# Patient Record
Sex: MALE | Race: WHITE | NOT HISPANIC OR LATINO | Employment: FULL TIME | URBAN - METROPOLITAN AREA
[De-identification: names, ages, dates, MRNs, and addresses within clinical notes are randomized per-mention and may not be internally consistent; named-entity substitution may affect disease eponyms.]

---

## 2019-10-22 ENCOUNTER — OFFICE VISIT (OUTPATIENT)
Dept: FAMILY MEDICINE CLINIC | Facility: CLINIC | Age: 59
End: 2019-10-22
Payer: COMMERCIAL

## 2019-10-22 VITALS
DIASTOLIC BLOOD PRESSURE: 80 MMHG | BODY MASS INDEX: 31.07 KG/M2 | SYSTOLIC BLOOD PRESSURE: 128 MMHG | HEIGHT: 70 IN | WEIGHT: 217 LBS | HEART RATE: 62 BPM | OXYGEN SATURATION: 97 % | TEMPERATURE: 96.5 F

## 2019-10-22 DIAGNOSIS — Z01.818 PREOPERATIVE EVALUATION OF A MEDICAL CONDITION TO RULE OUT SURGICAL CONTRAINDICATIONS (TAR REQUIRED): Primary | ICD-10-CM

## 2019-10-22 LAB
SL AMB  POCT GLUCOSE, UA: NEGATIVE
SL AMB LEUKOCYTE ESTERASE,UA: NEGATIVE
SL AMB POCT BILIRUBIN,UA: NEGATIVE
SL AMB POCT BLOOD,UA: NEGATIVE
SL AMB POCT CLARITY,UA: CLEAR
SL AMB POCT COLOR,UA: NORMAL
SL AMB POCT KETONES,UA: NEGATIVE
SL AMB POCT NITRITE,UA: NORMAL
SL AMB POCT PH,UA: 7
SL AMB POCT SPECIFIC GRAVITY,UA: 1
SL AMB POCT URINE PROTEIN: NEGATIVE
SL AMB POCT UROBILINOGEN: 0.2

## 2019-10-22 PROCEDURE — 99243 OFF/OP CNSLTJ NEW/EST LOW 30: CPT | Performed by: FAMILY MEDICINE

## 2019-10-22 PROCEDURE — 81003 URINALYSIS AUTO W/O SCOPE: CPT

## 2019-10-22 NOTE — ASSESSMENT & PLAN NOTE
Preoperative consultation  Patient appears to be in stable health    He is clear to have upcoming retinal procedure as described

## 2019-10-22 NOTE — PROGRESS NOTES
FAMILY PRACTICE OFFICE VISIT       NAME: Matilda Zavala  AGE: 63 y o  SEX: male       : 1960        MRN: [id number]    DATE: 10/22/2019  TIME: 11:34 AM    Assessment and Plan     Problem List Items Addressed This Visit        Other    Preoperative evaluation of a medical condition to rule out surgical contraindications (TAR required) - Primary     Preoperative consultation  Patient appears to be in stable health  He is clear to have upcoming retinal procedure as described                   Chief Complaint     Chief Complaint   Patient presents with    [facility]    Pre-op Exam       History of Present Illness     Patient has been diagnosed with a macular hole on his retina of his L eye  He denies any recent illness  He only takes over-the-counter Claritin for seasonal allergies  He is scheduled to have retinal procedure on 2019 by Fernando [address]      Review of Systems   Review of Systems   Constitutional: Negative  HENT: Negative  Eyes:        As per HPI   Respiratory: Negative  Cardiovascular: Negative  Gastrointestinal: Negative  Genitourinary: Negative  Musculoskeletal: Negative  Neurological: Negative  Psychiatric/Behavioral: Negative  Active Problem List     Patient Active Problem List   Diagnosis    Preoperative evaluation of a medical condition to rule out surgical contraindications (TAR required)       Past Medical History:  No past medical history on file  Past Surgical History:  No past surgical history on file  Family History:  No family history on file      Social History:  Social History     Socioeconomic History    Marital status: /Civil Union     Spouse name: Not on file    Number of children: Not on file    Years of education: Not on file    Highest education level: Not on file   Occupational History    Not on file   Social Needs    Financial resource strain: Not on file    Food insecurity: Worry: Not on file     Inability: Not on file    Transportation needs:     Medical: Not on file     Non-medical: Not on file   Tobacco Use    Smoking status: Not on file   Substance and Sexual Activity    Alcohol use: Not on file    Drug use: Not on file    Sexual activity: Not on file   Lifestyle    Physical activity:     Days per week: Not on file     Minutes per session: Not on file    Stress: Not on file   Relationships    Social connections:     Talks on phone: Not on file     Gets together: Not on file     Attends Worship service: Not on file     Active member of club or organization: Not on file     Attends meetings of clubs or organizations: Not on file     Relationship status: Not on file    Intimate partner violence:     Fear of current or ex partner: Not on file     Emotionally abused: Not on file     Physically abused: Not on file     Forced sexual activity: Not on file   Other Topics Concern    Not on file   Social History Narrative    Not on file       Objective     Vitals:    10/22/19 1103   BP: 128/80   Pulse: 62   Temp: (!) 96 5 °F (35 8 °C)   SpO2: 97%     Wt Readings from Last 3 Encounters:   10/22/19 98 4 kg (217 lb)       Physical Exam   Constitutional: He is oriented to person, place, and time  No distress  HENT:   Right Ear: External ear normal    Left Ear: External ear normal    Mouth/Throat: Oropharynx is clear and moist  No oropharyngeal exudate  Tympanic membranes within normal limits bilaterally   Neck:   No carotid bruits   Cardiovascular: Normal heart sounds  Regular rate and rhythm with no murmurs   Pulmonary/Chest: Breath sounds normal    Lungs are clear to auscultation without wheezes,rales, or rhonchi   Abdominal:   Abdomen is soft, nontender with positive bowel sounds  There is no rebound or guarding  No masses palpated   Musculoskeletal: He exhibits no edema  Lymphadenopathy:     He has no cervical adenopathy     Neurological: He is alert and oriented to person, place, and time  No cranial nerve deficit  Psychiatric: He has a normal mood and affect  His behavior is normal  Judgment and thought content normal        Pertinent Laboratory/Diagnostic Studies:  No results found for: GLUCOSE, BUN, CREATININE, CALCIUM, NA, K, CO2, CL  No results found for: ALT, AST, GGT, ALKPHOS, BILITOT    No results found for: WBC, HGB, HCT, MCV, PLT    No results found for: TSH    No results found for: CHOL  No results found for: TRIG  No results found for: HDL  No results found for: LDLCALC  No results found for: HGBA1C    No results found for this or any previous visit  No orders of the defined types were placed in this encounter  ALLERGIES:  Allergies   Allergen Reactions    Penicillins        Current Medications     No current outpatient medications on file  No current facility-administered medications for this visit  Health Maintenance     Health Maintenance   Topic Date Due    Hepatitis C Screening  1960    Depression Screening PHQ  1960    CRC Screening: Colonoscopy  1960    BMI: Followup Plan  06/12/1979    BMI: Adult  06/12/1979    DTaP,Tdap,and Td Vaccines (1 - Tdap) 06/12/1982    INFLUENZA VACCINE  07/01/2019    Pneumococcal Vaccine: 65+ Years (1 of 2 - PCV13) 06/12/2026    Pneumococcal Vaccine: Pediatrics (0 to 5 Years) and At-Risk Patients (6 to 59 Years)  Aged Out    HEPATITIS B VACCINES  Aged Out       There is no immunization history on file for this patient      Patrick Chavez MD

## 2021-04-08 DIAGNOSIS — Z23 ENCOUNTER FOR IMMUNIZATION: ICD-10-CM

## 2021-06-14 ENCOUNTER — VBI (OUTPATIENT)
Dept: ADMINISTRATIVE | Facility: OTHER | Age: 61
End: 2021-06-14

## 2022-08-18 ENCOUNTER — EVALUATION (OUTPATIENT)
Dept: PHYSICAL THERAPY | Facility: REHABILITATION | Age: 62
End: 2022-08-18
Payer: COMMERCIAL

## 2022-08-18 DIAGNOSIS — R42 VERTIGO: Primary | ICD-10-CM

## 2022-08-18 PROCEDURE — 97162 PT EVAL MOD COMPLEX 30 MIN: CPT

## 2022-08-18 PROCEDURE — 97110 THERAPEUTIC EXERCISES: CPT

## 2022-08-18 NOTE — PROGRESS NOTES
PT Evaluation     Today's date: 2022  Patient name: Elsa Eng  : 1960  MRN: [id number]  Referring provider: Luiz Knowles MD  Dx:   Encounter Diagnosis     ICD-10-CM    1  Vertigo  R42 PT plan of care cert/re-cert       Start Time: 1300  Stop Time: 1350  Total time in clinic (min): 50 minutes    PT PA supervised by PT LR with TERRENCE Ellington    Assessment  Assessment details: Elsa Eng is a pleasant 65 y o  male who presents with acute dizziness/vertigo  Prudencleann Ortizer presented with a right posterior canaliathiasis contributing to symptoms of dizzines and vertgio resulting in worry over not knowing what's wrong  No further referral appears necessary at this time based upon examination results  I expect he will improve in 4-6 weeks  Positive prognostic indicators include positive attitude toward recovery, good understanding of diagnosis and treatment plan options, acuity of symptoms, absence of peripheralization and absence of observed red flags  Negative prognostic indicators include none  Problem List:  1) Dizziness  2) Vertigo    Comparable signs:  1) Positive sung-hallpike on right side  2) Dizziness with bending over  Impairments: impaired balance and lacks appropriate home exercise program    Symptom irritability: moderateUnderstanding of Dx/Px/POC: good   Prognosis: good    Goals  Short Term Goals (by discharge):  1  Decreased intensity and frequency of symptoms  By 75%  2  Patient will exceed FOTO predicted outcome score  3  GROC >80%  4  Patient will be able to manage symptoms independently       Plan  Patient would benefit from: skilled physical therapy  Planned therapy interventions: canalith repositioning and home exercise program  Frequency: 1x week  Duration in visits: 6  Duration in weeks: 6  Treatment plan discussed with: patient        Subjective Evaluation    History of Present Illness  Mechanism of injury: Patient reports acute onset of vertigo/dizziness that started yesterday when he was bending over to  groceries  Patient reports room-spinning sensation and head fogginess that has persisted since yesterday  No history of vertigo dizziness but his mother and sister have had similar issues  Patient reports he feels off when he is up and moving but notices the room-spinning sensation only occurs with rolling in bed and laying  Patient feels mild nausea when symptoms occur but no vomiting  Patient denies any diplopia, dysarthria, dysphagia  Patient denies any extremity weakness or numbness/tingling  Denies any b/b dysfunction  Objective     Concurrent Complaints  Negative for headaches, nausea/motion sickness, tinnitus, visual change, hearing loss, memory loss, aural fullness, poor concentration and peripheral neuropathy  Neuro Exam:     Dizziness  Positive for disequilibrium and vertigo  Negative for oscillopsia, motion sickness, light-headedness, rocking or swaying, diplopia and floating or swimming  Exacerbating factors  Positive for bending over, rolling in bed, looking up, turning head and supine to/from sitting  Negative for walking, optokinetic movement and walking in busy environment  Symptoms   Duration: 1-3 minutes  Frequency: constant with the dysequilibirum, the vertigo occurs only with lying flat     Intensity at best: 3/10  Intensity at worst: 4/10    Headaches   Patient reports headaches: No      Oculomotor exam   Oculomotor ROM: WNL  Resting nystagmus: not present   Gaze holding nystagmus: not present left   Smooth pursuits: saccadic smooth pursuit  Vertical saccades: hypometric  Horizontal saccades: hypometric   Cover test: normal    Vitals  BP: 121/83  HR: 61bpm  SpO2: 96%    VOR Assessment  VOR: WNL  VORc: WNL  Head Thrust: WNL    Cervical AROM  Rotation: 25% limitation bilatera  Flexion: 25% limitation bilaterally  Extension: 25% limitation bilaterally  Sidebend: 50% limitation bilaterally    Positional Testing:  Landing-Hallpike: Positive for posterior canaliathiasis         Precautions: standard      Manuals 8/18            Epley Maneuver  x3 on R                                                    Neuro Re-Ed                                                                                                        Ther Ex                                                                                                                     Ther Activity                                       Gait Training                                       Modalities

## 2022-08-23 ENCOUNTER — APPOINTMENT (OUTPATIENT)
Dept: PHYSICAL THERAPY | Facility: REHABILITATION | Age: 62
End: 2022-08-23
Payer: COMMERCIAL

## 2022-09-19 NOTE — PROGRESS NOTES
PT Discharge Note  Patient has not returned or communicated intent to return to physical therapy for greater than 30 days  POC will be discharged at this time

## 2022-11-01 ENCOUNTER — NEW PATIENT COMPREHENSIVE (OUTPATIENT)
Dept: URBAN - METROPOLITAN AREA CLINIC 6 | Facility: CLINIC | Age: 62
End: 2022-11-01

## 2022-11-01 DIAGNOSIS — H25.812: ICD-10-CM

## 2022-11-01 DIAGNOSIS — H35.342: ICD-10-CM

## 2022-11-01 PROCEDURE — 92004 COMPRE OPH EXAM NEW PT 1/>: CPT

## 2022-11-01 ASSESSMENT — KERATOMETRY
OD_AXISANGLE2_DEGREES: 99
OD_AXISANGLE_DEGREES: 9
OS_K2POWER_DIOPTERS: 45.75
OS_K1POWER_DIOPTERS: 45.00
OD_K1POWER_DIOPTERS: 44.75
OS_AXISANGLE2_DEGREES: 88
OD_K2POWER_DIOPTERS: 45.75
OS_AXISANGLE_DEGREES: 178

## 2022-11-01 ASSESSMENT — TONOMETRY
OD_IOP_MMHG: 18
OS_IOP_MMHG: 16

## 2022-11-01 ASSESSMENT — VISUAL ACUITY
OS_PH: 20/50
OS_SC: 20/80
OD_SC: 20/25
OS_GLARE: 20/150+2

## 2022-12-01 ENCOUNTER — PRE-OP CATARACT MEASUREMENTS (OUTPATIENT)
Dept: URBAN - METROPOLITAN AREA CLINIC 6 | Facility: CLINIC | Age: 62
End: 2022-12-01

## 2022-12-01 DIAGNOSIS — H25.813: ICD-10-CM

## 2022-12-01 DIAGNOSIS — H35.412: ICD-10-CM

## 2022-12-01 DIAGNOSIS — H35.342: ICD-10-CM

## 2022-12-01 PROCEDURE — 92014 COMPRE OPH EXAM EST PT 1/>: CPT

## 2022-12-01 PROCEDURE — 92136 OPHTHALMIC BIOMETRY: CPT

## 2022-12-01 ASSESSMENT — VISUAL ACUITY
OD_GLARE: 20/70
OS_SC: 20/200
OD_PH: 20/25
OD_SC: 20/40+2
OU_SC: J1
OU_SC: 20/30-1
OS_PH: 20/50-1

## 2022-12-01 ASSESSMENT — KERATOMETRY
OD_K1POWER_DIOPTERS: 44.75
OD_K2POWER_DIOPTERS: 45.75
OS_AXISANGLE2_DEGREES: 88
OD_AXISANGLE_DEGREES: 9
OS_K1POWER_DIOPTERS: 45.00
OS_AXISANGLE_DEGREES: 178
OD_AXISANGLE2_DEGREES: 99
OS_K2POWER_DIOPTERS: 45.75

## 2022-12-01 ASSESSMENT — TONOMETRY
OD_IOP_MMHG: 13
OS_IOP_MMHG: 12

## 2022-12-15 ENCOUNTER — OFFICE VISIT (OUTPATIENT)
Dept: FAMILY MEDICINE CLINIC | Facility: CLINIC | Age: 62
End: 2022-12-15

## 2022-12-15 VITALS
BODY MASS INDEX: 30.88 KG/M2 | WEIGHT: 228 LBS | HEART RATE: 72 BPM | TEMPERATURE: 97.5 F | HEIGHT: 72 IN | OXYGEN SATURATION: 95 % | RESPIRATION RATE: 16 BRPM | DIASTOLIC BLOOD PRESSURE: 73 MMHG | SYSTOLIC BLOOD PRESSURE: 131 MMHG

## 2022-12-15 DIAGNOSIS — Z01.818 PREOPERATIVE EVALUATION OF A MEDICAL CONDITION TO RULE OUT SURGICAL CONTRAINDICATIONS (TAR REQUIRED): ICD-10-CM

## 2022-12-15 DIAGNOSIS — Z01.818 PREOP EXAMINATION: Primary | ICD-10-CM

## 2022-12-15 RX ORDER — CETIRIZINE HYDROCHLORIDE 10 MG/1
10 TABLET ORAL DAILY
COMMUNITY

## 2022-12-15 NOTE — PROGRESS NOTES
FAMILY PRACTICE OFFICE VISIT       NAME: Flash Joaquin  AGE: 65 y o  SEX: male       : 1960        MRN: [id number]    DATE: 12/15/2022  TIME: 10:06 AM    Assessment and Plan     Problem List Items Addressed This Visit        Other    Preoperative evaluation of a medical condition to rule out surgical contraindications (TAR required)     Preoperative consultation  Overall the patient appears to be in stable health and he is medically cleared for upcoming cataract surgery as described        Other Visit Diagnoses     Preop examination    -  Primary    Relevant Orders    POCT ECG (Completed)              Chief Complaint     Chief Complaint   Patient presents with   • Pre-op Exam       History of Present Illness     Patient in the office for preoperative consultation  He is scheduled to have cataract surgery of left eye on 2022  Procedure is to be performed by [facility] of [facility]  He denies any recent illness  Review of Systems   Review of Systems   Constitutional: Negative  HENT: Negative  Eyes: Positive for visual disturbance  Respiratory: Negative  Cardiovascular: Negative  Gastrointestinal: Negative  Genitourinary: Negative  Musculoskeletal: Negative  Skin: Negative  Neurological: Negative  Psychiatric/Behavioral: Negative  Active Problem List     Patient Active Problem List   Diagnosis   • Preoperative evaluation of a medical condition to rule out surgical contraindications (TAR required)       Past Medical History:  History reviewed  No pertinent past medical history  Past Surgical History:  Past Surgical History:   Procedure Laterality Date   • PARS PLANA VITRECTOMY W/ REPAIR OF MACULAR HOLE Left 2019       Family History:  History reviewed  No pertinent family history      Social History:  Social History     Socioeconomic History   • Marital status: /Civil Union     Spouse name: Not on file   • Number of children: Not on file   • Years of education: Not on file   • Highest education level: Not on file   Occupational History   • Not on file   Tobacco Use   • Smoking status: Not on file   • Smokeless tobacco: Not on file   Substance and Sexual Activity   • Alcohol use: Not on file   • Drug use: Not on file   • Sexual activity: Not on file   Other Topics Concern   • Not on file   Social History Narrative   • Not on file     Social Determinants of Health     Financial Resource Strain: Not on file   Food Insecurity: Not on file   Transportation Needs: Not on file   Physical Activity: Not on file   Stress: Not on file   Social Connections: Not on file   Intimate Partner Violence: Not on file   Housing Stability: Not on file       Objective     Vitals:    12/15/22 0846   BP: 131/73   Pulse: 72   Resp: 16   Temp: 97 5 °F (36 4 °C)   SpO2: 95%     Wt Readings from Last 3 Encounters:   12/15/22 103 kg (228 lb)   10/22/19 98 4 kg (217 lb)       Physical Exam  Constitutional:       General: He is not in acute distress  Appearance: Normal appearance  He is not ill-appearing  HENT:      Head: Normocephalic and atraumatic  Eyes:      General:         Right eye: No discharge  Left eye: No discharge  Extraocular Movements: Extraocular movements intact  Conjunctiva/sclera: Conjunctivae normal       Pupils: Pupils are equal, round, and reactive to light  Neck:      Vascular: No carotid bruit  Cardiovascular:      Rate and Rhythm: Normal rate and regular rhythm  Heart sounds: Normal heart sounds  No murmur heard  Pulmonary:      Effort: Pulmonary effort is normal       Breath sounds: Normal breath sounds  No wheezing, rhonchi or rales  Abdominal:      General: Abdomen is flat  Bowel sounds are normal  There is no distension  Palpations: Abdomen is soft  Tenderness: There is no abdominal tenderness  There is no guarding or rebound  Musculoskeletal:      Right lower leg: No edema        Left lower leg: No edema  Lymphadenopathy:      Cervical: No cervical adenopathy  Skin:     Findings: No rash  Neurological:      General: No focal deficit present  Mental Status: He is alert and oriented to person, place, and time  Cranial Nerves: No cranial nerve deficit  Psychiatric:         Mood and Affect: Mood normal          Behavior: Behavior normal          Thought Content: Thought content normal          Judgment: Judgment normal      EKG showed normal sinus rhythm at 63 bpm, normal axis, negative acute ischemic changes    Pertinent Laboratory/Diagnostic Studies:  No results found for: GLUCOSE, BUN, CREATININE, CALCIUM, NA, K, CO2, CL  No results found for: ALT, AST, GGT, ALKPHOS, BILITOT    No results found for: WBC, HGB, HCT, MCV, PLT    No results found for: TSH    No results found for: CHOL  No results found for: TRIG  No results found for: HDL  No results found for: LDLCALC  No results found for: HGBA1C    Results for orders placed or performed in visit on 10/22/19   POCT urine dip auto non-scope   Result Value Ref Range     COLOR,UA pale yellow     CLARITY,UA clear     SPECIFIC GRAVITY,UA 1 005      PH,UA 7 0     LEUKOCYTE ESTERASE,UA negative     NITRITE,UA negtive     GLUCOSE, UA negative     KETONES,UA negative     BILIRUBIN,UA negative     BLOOD,UA negative     POCT URINE PROTEIN negative     SL AMB POCT UROBILINOGEN 0 2        Orders Placed This Encounter   Procedures   • POCT ECG       ALLERGIES:  Allergies   Allergen Reactions   • Penicillins        Current Medications     Current Outpatient Medications   Medication Sig Dispense Refill   • cetirizine (ZyrTEC) 10 mg tablet Take 10 mg by mouth daily     • Loratadine-Pseudoephedrine (PX ALLERGY RELIEF D, LORATID, PO) Take by mouth     • Multiple Vitamins-Minerals (MULTIVITAMIN MEN PO) Take by mouth       No current facility-administered medications for this visit           Health Maintenance     Health Maintenance   Topic Date Due   • Hepatitis C Screening  Never done   • Hepatitis B Vaccine (1 of 3 - 3-dose series) Never done   • COVID-19 Vaccine (1) Never done   • HIV Screening  Never done   • BMI: Followup Plan  Never done   • Annual Physical  Never done   • DTaP,Tdap,and Td Vaccines (1 - Tdap) Never done   • Colorectal Cancer Screening  Never done   • Influenza Vaccine (1) 06/30/2023 (Originally 9/1/2022)   • Depression Screening  12/15/2023   • BMI: Adult  12/15/2023   • Pneumococcal Vaccine: Pediatrics (0 to 5 Years) and At-Risk Patients (6 to 59 Years)  Aged Out   • HIB Vaccine  Aged Out   • IPV Vaccine  Aged Out   • Hepatitis A Vaccine  Aged Out   • Meningococcal ACWY Vaccine  Aged Out   • HPV Vaccine  Aged Out       There is no immunization history on file for this patient      Collin Samaniego MD

## 2022-12-15 NOTE — ASSESSMENT & PLAN NOTE
Preoperative consultation    Overall the patient appears to be in stable health and he is medically cleared for upcoming cataract surgery as described

## 2022-12-28 ENCOUNTER — SURGERY/PROCEDURE (OUTPATIENT)
Dept: URBAN - METROPOLITAN AREA SURGICAL CENTER 6 | Facility: SURGICAL CENTER | Age: 62
End: 2022-12-28

## 2022-12-28 DIAGNOSIS — H21.81: ICD-10-CM

## 2022-12-28 DIAGNOSIS — H57.03: ICD-10-CM

## 2022-12-28 DIAGNOSIS — H25.812: ICD-10-CM

## 2022-12-28 PROCEDURE — 66982 XCAPSL CTRC RMVL CPLX WO ECP: CPT

## 2022-12-29 ENCOUNTER — 1 DAY POST-OP (OUTPATIENT)
Dept: URBAN - METROPOLITAN AREA CLINIC 6 | Facility: CLINIC | Age: 62
End: 2022-12-29

## 2022-12-29 DIAGNOSIS — Z96.1: ICD-10-CM

## 2022-12-29 PROCEDURE — 99024 POSTOP FOLLOW-UP VISIT: CPT

## 2022-12-29 ASSESSMENT — KERATOMETRY
OS_AXISANGLE2_DEGREES: 88
OD_AXISANGLE2_DEGREES: 99
OS_K2POWER_DIOPTERS: 45.75
OS_AXISANGLE2_DEGREES: 88
OD_K2POWER_DIOPTERS: 45.75
OS_K2POWER_DIOPTERS: 45.75
OS_AXISANGLE_DEGREES: 178
OD_AXISANGLE_DEGREES: 9
OD_AXISANGLE_DEGREES: 9
OS_AXISANGLE_DEGREES: 178
OD_K1POWER_DIOPTERS: 44.75
OS_K1POWER_DIOPTERS: 45.00
OS_K1POWER_DIOPTERS: 45.00
OD_AXISANGLE2_DEGREES: 99
OD_K2POWER_DIOPTERS: 45.75
OD_K1POWER_DIOPTERS: 44.75

## 2022-12-29 ASSESSMENT — VISUAL ACUITY: OS_SC: 20/25 +/-2

## 2022-12-29 ASSESSMENT — TONOMETRY: OS_IOP_MMHG: 15

## 2023-01-06 ENCOUNTER — 1 WEEK POST-OP (OUTPATIENT)
Dept: URBAN - METROPOLITAN AREA CLINIC 6 | Facility: CLINIC | Age: 63
End: 2023-01-06

## 2023-01-06 DIAGNOSIS — Z96.1: ICD-10-CM

## 2023-01-06 PROCEDURE — 99024 POSTOP FOLLOW-UP VISIT: CPT

## 2023-01-06 ASSESSMENT — KERATOMETRY
OS_K1POWER_DIOPTERS: 45.00
OD_AXISANGLE2_DEGREES: 99
OD_K1POWER_DIOPTERS: 44.75
OD_AXISANGLE_DEGREES: 9
OS_AXISANGLE_DEGREES: 178
OD_K2POWER_DIOPTERS: 45.75
OS_AXISANGLE2_DEGREES: 88
OS_K2POWER_DIOPTERS: 45.75

## 2023-01-06 ASSESSMENT — VISUAL ACUITY: OS_SC: 20/30

## 2023-01-06 ASSESSMENT — TONOMETRY
OD_IOP_MMHG: 11
OS_IOP_MMHG: 10

## 2023-02-03 ENCOUNTER — 1 MONTH POST-OP (OUTPATIENT)
Dept: URBAN - METROPOLITAN AREA CLINIC 6 | Facility: CLINIC | Age: 63
End: 2023-02-03

## 2023-02-03 DIAGNOSIS — Z96.1: ICD-10-CM

## 2023-02-03 PROCEDURE — 99024 POSTOP FOLLOW-UP VISIT: CPT

## 2023-02-03 ASSESSMENT — TONOMETRY
OD_IOP_MMHG: 12
OS_IOP_MMHG: 13

## 2023-02-03 ASSESSMENT — VISUAL ACUITY
OS_SC: 20/40+1
OD_SC: 20/40+1

## 2023-02-03 ASSESSMENT — KERATOMETRY
OD_K1POWER_DIOPTERS: 44.75
OD_AXISANGLE_DEGREES: 9
OS_AXISANGLE_DEGREES: 178
OD_AXISANGLE2_DEGREES: 99
OD_K2POWER_DIOPTERS: 45.75
OS_K1POWER_DIOPTERS: 45.00
OS_K2POWER_DIOPTERS: 45.75
OS_AXISANGLE2_DEGREES: 88

## 2023-05-19 ENCOUNTER — ESTABLISHED COMPREHENSIVE EXAM (OUTPATIENT)
Dept: URBAN - METROPOLITAN AREA CLINIC 6 | Facility: CLINIC | Age: 63
End: 2023-05-19

## 2023-05-19 DIAGNOSIS — H25.811: ICD-10-CM

## 2023-05-19 DIAGNOSIS — H35.412: ICD-10-CM

## 2023-05-19 DIAGNOSIS — Z96.1: ICD-10-CM

## 2023-05-19 DIAGNOSIS — H35.342: ICD-10-CM

## 2023-05-19 PROCEDURE — 92014 COMPRE OPH EXAM EST PT 1/>: CPT

## 2023-05-19 ASSESSMENT — TONOMETRY
OS_IOP_MMHG: 16
OD_IOP_MMHG: 13

## 2023-05-19 ASSESSMENT — KERATOMETRY
OS_K2POWER_DIOPTERS: 45.75
OD_AXISANGLE_DEGREES: 9
OD_AXISANGLE2_DEGREES: 99
OS_K1POWER_DIOPTERS: 45.00
OD_K1POWER_DIOPTERS: 44.75
OD_K2POWER_DIOPTERS: 45.75
OS_AXISANGLE2_DEGREES: 88
OS_AXISANGLE_DEGREES: 178

## 2023-05-19 ASSESSMENT — VISUAL ACUITY
OS_SC: 20/20-2
OD_SC: 20/25-2

## 2023-11-30 DIAGNOSIS — Z12.12 SCREENING FOR COLORECTAL CANCER: Primary | ICD-10-CM

## 2023-11-30 DIAGNOSIS — Z12.11 SCREENING FOR COLORECTAL CANCER: Primary | ICD-10-CM

## 2023-12-07 ENCOUNTER — OFFICE VISIT (OUTPATIENT)
Dept: FAMILY MEDICINE CLINIC | Facility: CLINIC | Age: 63
End: 2023-12-07
Payer: COMMERCIAL

## 2023-12-07 VITALS
SYSTOLIC BLOOD PRESSURE: 140 MMHG | DIASTOLIC BLOOD PRESSURE: 80 MMHG | BODY MASS INDEX: 31.15 KG/M2 | OXYGEN SATURATION: 94 % | RESPIRATION RATE: 16 BRPM | HEIGHT: 72 IN | TEMPERATURE: 97.6 F | WEIGHT: 230 LBS | HEART RATE: 81 BPM

## 2023-12-07 DIAGNOSIS — M79.601 PAIN OF RIGHT UPPER EXTREMITY: Primary | ICD-10-CM

## 2023-12-07 PROCEDURE — 99213 OFFICE O/P EST LOW 20 MIN: CPT | Performed by: FAMILY MEDICINE

## 2023-12-07 RX ORDER — MELOXICAM 15 MG/1
15 TABLET ORAL DAILY
Qty: 30 TABLET | Refills: 0 | Status: SHIPPED | OUTPATIENT
Start: 2023-12-07

## 2023-12-07 RX ORDER — METHOCARBAMOL 500 MG/1
500 TABLET, FILM COATED ORAL
Qty: 15 TABLET | Refills: 0 | Status: SHIPPED | OUTPATIENT
Start: 2023-12-07

## 2023-12-07 NOTE — PROGRESS NOTES
FAMILY PRACTICE OFFICE VISIT       NAME: Nasra Gupta  AGE: 59 y.o. SEX: male       : 1960        MRN: [id number]    DATE: 2023  TIME: 8:47 AM    Assessment and Plan     Problem List Items Addressed This Visit       Pain of right upper extremity - Primary     Arm pain. Patient suspected to have rotator cuff tendinopathy. No evidence of any tears. Patient given prescription to initiate meloxicam 15 mg once daily. He will also use methocarbamol 500 mg nightly. Patient will call if symptoms persist without improvement after 2 weeks whereby we would consider physical therapy consultation         Relevant Medications    meloxicam (MOBIC) 15 mg tablet    methocarbamol (ROBAXIN) 500 mg tablet           Chief Complaint     Chief Complaint   Patient presents with    Shoulder Pain     Patient is here for rt shoulder pain 6 + mos       History of Present Illness     Patient in the office with 6-month history of right upper arm discomfort. Patient describes muscular type of ache fairly regularly but denies any acute injuries. Symptoms are felt more with lifting or raking leaves. He denies any radicular type of pain from his neck. He has taken over-the-counter ibuprofen on a rare occasion. He has no prior history of injuries to his right arm        Review of Systems   Review of Systems   Constitutional: Negative. Musculoskeletal:  Positive for arthralgias. Neurological: Negative. Active Problem List     Patient Active Problem List   Diagnosis    Preoperative evaluation of a medical condition to rule out surgical contraindications (TAR required)    Pain of right upper extremity       Past Medical History:  No past medical history on file. Past Surgical History:  Past Surgical History:   Procedure Laterality Date    PARS PLANA VITRECTOMY W/ REPAIR OF MACULAR HOLE Left 2019       Family History:  No family history on file.     Social History:  Social History     Socioeconomic History Marital status: /Civil Union     Spouse name: Not on file    Number of children: Not on file    Years of education: Not on file    Highest education level: Not on file   Occupational History    Not on file   Tobacco Use    Smoking status: Never    Smokeless tobacco: Never   Substance and Sexual Activity    Alcohol use: Yes     Comment: rare    Drug use: Never    Sexual activity: Yes   Other Topics Concern    Not on file   Social History Narrative    Not on file     Social Determinants of Health     Financial Resource Strain: Not on file   Food Insecurity: Not on file   Transportation Needs: Not on file   Physical Activity: Not on file   Stress: Not on file   Social Connections: Not on file   Intimate Partner Violence: Not on file   Housing Stability: Not on file       Objective     Vitals:    12/07/23 0818   BP: 140/80   Pulse: 81   Resp: 16   Temp: 97.6 °F (36.4 °C)   SpO2: 94%     Wt Readings from Last 3 Encounters:   12/07/23 104 kg (230 lb)   12/15/22 103 kg (228 lb)   10/22/19 98.4 kg (217 lb)       Physical Exam  Constitutional:       General: He is not in acute distress. Appearance: Normal appearance. He is not ill-appearing. Musculoskeletal:         General: No tenderness, deformity or signs of injury. Comments: Patient with normal range of motion of right arm. Increased discomfort along right triceps area with internal and external rotation. Muscle strength +5/5. Deep tendon reflexes +2. Normal range of motion of cervical spine in all directions   Neurological:      Mental Status: He is alert.          Pertinent Laboratory/Diagnostic Studies:  No results found for: "GLUCOSE", "BUN", "CREATININE", "CALCIUM", "NA", "K", "CO2", "CL"  No results found for: "ALT", "AST", "GGT", "ALKPHOS", "BILITOT"    No results found for: "WBC", "HGB", "HCT", "MCV", "PLT"    No results found for: "TSH"    No results found for: "CHOL"  No results found for: "TRIG"  No results found for: "HDL"  No results found for: "[facility]"  No results found for: "HGBA1C"    Results for orders placed or performed in visit on 03/31/21   Human Immunodeficiency Virus 1/2 Antigen / Antibody ( Fourth Generation) with Reflex Testing   Result Value Ref Range    HIV Screen Non-Reactive        No orders of the defined types were placed in this encounter. ALLERGIES:  Allergies   Allergen Reactions    Penicillins        Current Medications     Current Outpatient Medications   Medication Sig Dispense Refill    cetirizine (ZyrTEC) 10 mg tablet Take 10 mg by mouth daily      Loratadine-Pseudoephedrine (PX ALLERGY RELIEF D, LORATID, PO) Take by mouth      meloxicam (MOBIC) 15 mg tablet Take 1 tablet (15 mg total) by mouth daily 30 tablet 0    methocarbamol (ROBAXIN) 500 mg tablet Take 1 tablet (500 mg total) by mouth daily at bedtime 15 tablet 0    Multiple Vitamins-Minerals (MULTIVITAMIN MEN PO) Take by mouth       No current facility-administered medications for this visit. Health Maintenance     Health Maintenance   Topic Date Due    Hepatitis C Screening  Never done    COVID-19 Vaccine (1) Never done    BMI: Followup Plan  Never done    Annual Physical  Never done    DTaP,Tdap,and Td Vaccines (1 - Tdap) Never done    Colorectal Cancer Screening  Never done    BMI: Adult  12/15/2023    Influenza Vaccine (1) 06/30/2024 (Originally 9/1/2023)    Depression Screening  12/07/2024    HIV Screening  Completed    Pneumococcal Vaccine: Pediatrics (0 to 5 Years) and At-Risk Patients (6 to 59 Years)  Aged Out    HIB Vaccine  Aged Out    IPV Vaccine  Aged Out    Hepatitis A Vaccine  Aged Out    Meningococcal ACWY Vaccine  Aged Out    HPV Vaccine  Aged Out       There is no immunization history on file for this patient.     Jaycee Lal MD

## 2023-12-07 NOTE — ASSESSMENT & PLAN NOTE
Arm pain. Patient suspected to have rotator cuff tendinopathy. No evidence of any tears. Patient given prescription to initiate meloxicam 15 mg once daily. He will also use methocarbamol 500 mg nightly.   Patient will call if symptoms persist without improvement after 2 weeks whereby we would consider physical therapy consultation

## 2024-01-02 ENCOUNTER — TELEPHONE (OUTPATIENT)
Dept: FAMILY MEDICINE CLINIC | Facility: CLINIC | Age: 64
End: 2024-01-02

## 2024-01-02 DIAGNOSIS — M79.601 PAIN OF RIGHT UPPER EXTREMITY: ICD-10-CM

## 2024-01-02 RX ORDER — MELOXICAM 15 MG/1
15 TABLET ORAL DAILY
Qty: 30 TABLET | Refills: 2 | Status: SHIPPED | OUTPATIENT
Start: 2024-01-02

## 2024-01-02 NOTE — TELEPHONE ENCOUNTER
Refill for meloxicam sent to pharmacy.  Referral for physical therapy placed in [facility].  Patient may call for appointment.  Please provide phone number

## 2024-01-02 NOTE — TELEPHONE ENCOUNTER
"Received VM from patient:     \"Hi, this is Jered Caraballo calling. I had an appointment last week with a couple of weeks ago with Doctor Arlene. For issue I'm having with my right arm, he gave me some medication. I'm almost out of that medication. There's no refills on it and he was saying the next step may possibly be some sessions at the physical therapy session So I don't know if he needs me to have make another appointment to stop him before I do that or how he wants to handle it. But I'm at the end of the medication so I probably need a refill on that even though there's says 0 refills on the bottle and then possibly taking a look at doing physical therapy. So whatever he wants to do, please let me know and I'll either set up an appointment to come back in there or head to physical therapy if he has that set up. Thank you. Bye, bye.\"    Can meloxicam and robaxin be refilled?  "

## 2024-01-04 ENCOUNTER — EVALUATION (OUTPATIENT)
Dept: PHYSICAL THERAPY | Facility: REHABILITATION | Age: 64
End: 2024-01-04
Payer: COMMERCIAL

## 2024-01-04 DIAGNOSIS — M79.601 PAIN OF RIGHT UPPER EXTREMITY: ICD-10-CM

## 2024-01-04 PROCEDURE — 97110 THERAPEUTIC EXERCISES: CPT | Performed by: PHYSICAL THERAPIST

## 2024-01-04 PROCEDURE — 97162 PT EVAL MOD COMPLEX 30 MIN: CPT | Performed by: PHYSICAL THERAPIST

## 2024-01-04 NOTE — PROGRESS NOTES
PT Evaluation     Today's date: 2024  Patient name: Jered Caraballo  : 1960  MRN: [id number]  Referring provider: Naveed Rao MD  Dx:   Encounter Diagnosis     ICD-10-CM    1. Pain of right upper extremity  M79.601 Ambulatory Referral to Physical Therapy          Start Time: 945  Stop Time: 1025  Total time in clinic (min): 40 minutes    Assessment  Assessment details: Problem List:  1) Glenohumeral hypomobility  2) Pain with everyday use and movements    Jered Caraballo is a pleasant 63 y.o. male who presents with R shoulder/arm pain that has been bothering him for at least 6 months. Feels it is worsening.  He has severe shoulder hypomobility especially into passive/active ER and active IR, weakness, and pain with everyday tasks resulting in the pain he is experiencing and fear of not being able to keep active.  Patient would benefit from further consultation from orthopedics. I expect he will improve in 12-16 weeks. Jered would benefit from skilled physical therapy to address his limitations and allow him to return to his PLOF.    At this time, PT recommends consultation from orthopedics to further evaluate his shoulder pain and discomfort. Following consultation, pt will reach out to office for further treatment if necessary.   Impairments: abnormal or restricted ROM, activity intolerance, impaired physical strength, lacks appropriate home exercise program, pain with function, weight-bearing intolerance and poor posture     Symptom irritability: lowUnderstanding of Dx/Px/POC: good   Prognosis: good    Goals  ST-4 weeks  Patient will be independent with home exercise program.   Patient will be able to manage symptoms independently.  Patient will decrease pain by 25-50%    LTG: by discharge  Patient will improve FOTO to goal  Patient will report minimal (1-2/10) pain with aggravating activities to display improvements in overall functional status        Plan  Patient would benefit from: skilled  physical therapy  Planned modality interventions: cryotherapy, thermotherapy: hydrocollator packs and unattended electrical stimulation  Planned therapy interventions: IADL retraining, joint mobilization, manual therapy, massage, ADL training, activity modification, abdominal trunk stabilization, ADL retraining, balance, balance/weight bearing training, neuromuscular re-education, body mechanics training, behavior modification, strengthening, stretching, therapeutic activities, therapeutic exercise, therapeutic training, transfer training, graded exercise, graded motor, home exercise program, graded activity, gait training, functional ROM exercises, patient education, postural training, IASTM, kinesiology taping and flexibility  Frequency: 2x week  Duration in visits: 16  Duration in weeks: 8  Treatment plan discussed with: patient        Subjective Evaluation    History of Present Illness  Mechanism of injury: Jered is a 63 y.o. male presenting to physical therapy on 24 with referral from MD for R shoulder/arm pain that has been bothering him for about 6-7 months. Denies numbness/tingling. Difficulty with lifting, reaching arm behind back and neck and moving arm into certain positions. Has issues sleeping due to pain.           Quality of life: good    Patient Goals  Patient goals for therapy: increased strength, independence with ADLs/IADLs, return to sport/leisure activities, decreased pain and increased motion    Pain  Current pain ratin  At best pain ratin  At worst pain ratin  Location: whole shoulder  Quality: dull ache  Relieving factors: relaxation, rest, medications and change in position  Aggravating factors: lifting and overhead activity (putting on and off coat, reaching behind back, reaching behind head)  Progression: worsening    Treatments  Current treatment: medication        Objective    Myotomes all intact b/l   Dermatome: all intact b/l           Reflexes:  C5-6: 2+ b/l   C5-6:   2+ b/l    C7:  2+ b/l                        MMT         AROM          PROM    Shoulder       L       R        L           R      L     R   Flex.    105 deg PAIN  deg   Abd.    95 deg PAIN WFL 75 deg   IR.    Sacrum PAIN WFL 30 deg   ER.    C6 PAIN WFL 0 deg at side            Rhomboid         Mid Trap         Low Trap         Serratus         Infraspnatus         Teres Major         Sub Scap             Rotator Cuff Testing:  ER Lag: negative   Drop Arm: negative   Painful Arc Sign: positive    Belly Press: negative     Impingement Testing:   Kaitlin: positive  Infraspinatus MMT: positive  Painful Arc Sign: positive    Thoracic Spine:          Segmental mobility: GHJ: hypomobile into posterior and inferior glides          Precautions: standard    Manuals 1/4                                                                Neuro Re-Ed 1/4                                                                                                       Ther Ex 1/4                                                                                                       Education 10'            Ther Activity                                       Gait Training                                       Modalities

## 2024-01-29 ENCOUNTER — APPOINTMENT (OUTPATIENT)
Dept: RADIOLOGY | Facility: OTHER | Age: 64
End: 2024-01-29
Payer: COMMERCIAL

## 2024-01-29 ENCOUNTER — OFFICE VISIT (OUTPATIENT)
Dept: OBGYN CLINIC | Facility: OTHER | Age: 64
End: 2024-01-29
Payer: COMMERCIAL

## 2024-01-29 VITALS
SYSTOLIC BLOOD PRESSURE: 133 MMHG | DIASTOLIC BLOOD PRESSURE: 86 MMHG | BODY MASS INDEX: 30.61 KG/M2 | WEIGHT: 226 LBS | HEART RATE: 86 BPM | HEIGHT: 72 IN

## 2024-01-29 DIAGNOSIS — M25.511 ACUTE PAIN OF RIGHT SHOULDER: ICD-10-CM

## 2024-01-29 DIAGNOSIS — M75.01 ADHESIVE CAPSULITIS OF RIGHT SHOULDER: Primary | ICD-10-CM

## 2024-01-29 PROCEDURE — 73030 X-RAY EXAM OF SHOULDER: CPT

## 2024-01-29 PROCEDURE — 20610 DRAIN/INJ JOINT/BURSA W/O US: CPT | Performed by: ORTHOPAEDIC SURGERY

## 2024-01-29 PROCEDURE — 99204 OFFICE O/P NEW MOD 45 MIN: CPT | Performed by: ORTHOPAEDIC SURGERY

## 2024-01-29 RX ORDER — BUPIVACAINE HYDROCHLORIDE 2.5 MG/ML
1 INJECTION, SOLUTION INFILTRATION; PERINEURAL
Status: COMPLETED | OUTPATIENT
Start: 2024-01-29 | End: 2024-01-29

## 2024-01-29 RX ORDER — BETAMETHASONE SODIUM PHOSPHATE AND BETAMETHASONE ACETATE 3; 3 MG/ML; MG/ML
6 INJECTION, SUSPENSION INTRA-ARTICULAR; INTRALESIONAL; INTRAMUSCULAR; SOFT TISSUE
Status: COMPLETED | OUTPATIENT
Start: 2024-01-29 | End: 2024-01-29

## 2024-01-29 RX ADMIN — BETAMETHASONE SODIUM PHOSPHATE AND BETAMETHASONE ACETATE 6 MG: 3; 3 INJECTION, SUSPENSION INTRA-ARTICULAR; INTRALESIONAL; INTRAMUSCULAR; SOFT TISSUE at 08:30

## 2024-01-29 RX ADMIN — BUPIVACAINE HYDROCHLORIDE 1 ML: 2.5 INJECTION, SOLUTION INFILTRATION; PERINEURAL at 08:30

## 2024-01-29 NOTE — PROGRESS NOTES
I personally examined the patient and reviewed the history provided.  I agree with the note and the assessment and plan by Dr. Sheela Okeefe MD.     Assessment:    Right shoulder adhesive capsulitis    Plan:    Did review the natural history and treatment options for adhesive capsulitis of the right shoulder with the patient clearly has.  We did discuss the association of diabetes with adhesive capsulitis and the patient does not have diabetes at this time and does have good clinical follow-up with his primary care doctor.  He was provided with an intra-articular humeral injection under my direct supervision as detailed below as well as referral to physical therapy for stretching only.  Reevaluation in 3 months or sooner if necessary, avoidance of any resistance exercises is essential in improving his symptoms.  Imaging the form of an MRI is not required at this time but could always be considered in the future especially if his range of motion improves but he maintains his symptoms        Assessment  Diagnoses and all orders for this visit:    Adhesive capsulitis of right shoulder  -     Large joint arthrocentesis: R glenohumeral    Acute pain of right shoulder  -     XR shoulder 2+ vw right; Future      Discussion and Plan:  Mr Caraballo presentation is consistent with adhesive capsulitis of the right shoulder. He has tried activity modification and NSAIDs without relief. His initial trial of PT was ineffective 2/2 pain. He would benefit from trial of a corticosteroid injection today with continued PT (stretching only, no strengthening). Post injection instructions provided. Discussed repeat injection can be undertaken at earliest in 3 months. Return to care in 3 months for re-evaluation.       Subjective:   Patient ID: Jered Caraballo is a 63 y.o. male      HPI  The patient presents with a chief complaint of right shoulder pain.   The pain began 6 month(s) ago and is not associated with an acute injury. The patient  describes the pain as aching in intensity,  intermittent, occurring with increasing frequency in timing, and localizes the pain to the  right glenohumeral joint.  The pain is worse with overhead work, overuse, and raising arm over head and relieved by avoiding the painful activities.  The pain is not associated with numbness and tingling.  The pain is not associated with constitutional symptoms. The patient is not awoken at night by the pain.  He had initially seen his PCP, who provided meloxicam/robaxin and a referral to PT. He saw PT x1, but was limited 2/2 pain so he was referred to ortho for potential CSI to allow more therapy to be undertaken.          The following portions of the patient's history were reviewed and updated as appropriate: allergies, current medications, past family history, past medical history, past social history, past surgical history and problem list.    Review of Systems  Negative unless otherwise stated above.    Objective:  /86 (BP Location: Right arm, Patient Position: Sitting, Cuff Size: Large)   Pulse 86   Ht 6' (1.829 m) Comment: verbal  Wt 103 kg (226 lb)   BMI 30.65 kg/m²       Right Shoulder Exam     Range of Motion   Active abduction:  100   External rotation:  20   Forward flexion:  160   Internal rotation 0 degrees:  Sacrum     Muscle Strength   Abduction: 5/5   Internal rotation: 4/5   External rotation: 5/5     Tests   Hallman test: negative  Impingement: negative  Drop arm: negative    Other   Erythema: absent  Sensation: normal  Pulse: present            Physical Exam  Vitals reviewed.   Constitutional:       General: He is not in acute distress.  HENT:      Head: Normocephalic and atraumatic.   Eyes:      Extraocular Movements: Extraocular movements intact.      Conjunctiva/sclera: Conjunctivae normal.      Pupils: Pupils are equal, round, and reactive to light.   Cardiovascular:      Rate and Rhythm: Normal rate.      Pulses: Normal pulses.   Pulmonary:       "Effort: Pulmonary effort is normal.   Abdominal:      General: Abdomen is flat.   Musculoskeletal:      Cervical back: Normal range of motion.   Skin:     General: Skin is warm and dry.      Capillary Refill: Capillary refill takes less than 2 seconds.   Neurological:      Mental Status: He is alert and oriented to person, place, and time.   Psychiatric:         Mood and Affect: Mood normal.           I have personally reviewed pertinent films in PACS and my interpretation is as follows.  XR R shoulder demonstrates no acute fracture or dislocation. Preserved joint space.    Large joint arthrocentesis: R glenohumeral  [place] Protocol:  Consent: Verbal consent obtained.  Risks and benefits: risks, benefits and alternatives were discussed  Consent given by: patient  Time out: Immediately prior to procedure a \"time out\" was called to verify the correct patient, procedure, equipment, support staff and site/side marked as required.  Patient understanding: patient states understanding of the procedure being performed  Site marked: the operative site was marked  Patient identity confirmed: verbally with patient  Supporting Documentation  Indications: pain   Procedure Details  Location: shoulder - R glenohumeral  Needle size: 22 G  Ultrasound guidance: no  Medications administered: 1 mL bupivacaine 0.25 %; 6 mg betamethasone acetate-betamethasone sodium phosphate 6 (3-3) mg/mL    Patient tolerance: patient tolerated the procedure well with no immediate complications  Dressing:  Sterile dressing applied        I did personally review the notes in the electronic medical record system from his visits with Dr. Chase of primary care as well as his visit with Hernando Sawyer of physical therapy with resultant referral to see me for further evaluation    "

## 2024-01-29 NOTE — PATIENT INSTRUCTIONS
You have a frozen shoulder, also known as adhesive capsulitis. It will take 1-2 weeks to feel relief from the cortisone injection provided today. This will hopefully provide sufficient relief to work on stretching with PT. Do not work on strengthening. Return to care in 3 months for re-eval.

## 2024-02-01 ENCOUNTER — TELEPHONE (OUTPATIENT)
Dept: OBGYN CLINIC | Facility: HOSPITAL | Age: 64
End: 2024-02-01

## 2024-02-01 NOTE — TELEPHONE ENCOUNTER
Referral was placed at his appointment this week.  When he starts therapy is per their availability - we would hope by sometime next week.

## 2024-02-01 NOTE — TELEPHONE ENCOUNTER
Caller: Patient     Doctor: Kristina    Reason for call: Patient would like to know when he can resume Physical Therapy again.     Call back#: [phone]

## 2024-02-07 ENCOUNTER — APPOINTMENT (OUTPATIENT)
Dept: PHYSICAL THERAPY | Facility: REHABILITATION | Age: 64
End: 2024-02-07
Payer: COMMERCIAL

## 2024-02-12 ENCOUNTER — EVALUATION (OUTPATIENT)
Dept: PHYSICAL THERAPY | Facility: REHABILITATION | Age: 64
End: 2024-02-12
Payer: COMMERCIAL

## 2024-02-12 DIAGNOSIS — M75.01 ADHESIVE CAPSULITIS OF RIGHT SHOULDER: Primary | ICD-10-CM

## 2024-02-12 PROCEDURE — 97110 THERAPEUTIC EXERCISES: CPT | Performed by: PHYSICAL THERAPIST

## 2024-02-12 PROCEDURE — 97140 MANUAL THERAPY 1/> REGIONS: CPT | Performed by: PHYSICAL THERAPIST

## 2024-02-12 NOTE — PROGRESS NOTES
Daily Note     Today's date: 2024  Patient name: Jered Caraballo  : 1960  MRN: [id number]  Referring provider: Naveed Rao MD  Dx:   Encounter Diagnosis     ICD-10-CM    1. Adhesive capsulitis of right shoulder  M75.01           Start Time: 0815  Stop Time: 0900  Total time in clinic (min): 45 minutes    Subjective: Reports his shoulder is doing better since his injection. States his pain is decreased and he can move his arm better.     Objective: See treatment diary below    R shoulder AROM  Flexion: 142 deg  Abduction: 129 deg  ER: T1  IR: PSIS  R shoulder PROM:   Flexion: 150 deg   Abduction: 112 deg   ER: 35 deg   IR: 20 deg    Assessment: Tolerated treatment well. Significant improvements in range of motion and pain noted since last treatment session. Went over comprehensive HEP for pt to perform everyday to maintain and improve mobility. Patient exhibited good technique with therapeutic exercises and would benefit from continued PT    Plan: Continue per plan of care.      Precautions: standard    Manuals            GHJ mobs  Post glide GrIII 3x30 QD           ACJ mobs  PA glide 3x30 GrIII           SCJ mobs  GrIII inf glide 3x30 QD                                                  Assessment  QD 12'           Neuro Re-Ed                                                                                                       Ther Ex            Supine shld flexion AAROM  HEP 3x10           IR stretch with strap  10x HEP           ER stretch on wall  10x HEP                                                               Education 10' 15' HEP           Ther Activity                                       Gait Training                                       Modalities

## 2024-02-26 ENCOUNTER — OFFICE VISIT (OUTPATIENT)
Dept: PHYSICAL THERAPY | Facility: REHABILITATION | Age: 64
End: 2024-02-26
Payer: COMMERCIAL

## 2024-02-26 DIAGNOSIS — M75.01 ADHESIVE CAPSULITIS OF RIGHT SHOULDER: Primary | ICD-10-CM

## 2024-02-26 PROCEDURE — 97110 THERAPEUTIC EXERCISES: CPT | Performed by: PHYSICAL THERAPIST

## 2024-02-26 PROCEDURE — 97140 MANUAL THERAPY 1/> REGIONS: CPT | Performed by: PHYSICAL THERAPIST

## 2024-02-26 NOTE — PROGRESS NOTES
Daily Note     Today's date: 2024  Patient name: Jered Caraballo  : 1960  MRN: [id number]  Referring provider: Naveed Rao MD  Dx:   Encounter Diagnosis     ICD-10-CM    1. Adhesive capsulitis of right shoulder  M75.01           Start Time: 815  Stop Time: 0858  Total time in clinic (min): 43 minutes    Subjective: Reports the exercises have been helping, but still has difficulty with reaching behind his back.       Objective: See treatment diary below    Flexion: 141 deg  Abduction: 92 deg  ER: 10 deg  IR: 34 deg    Assessment: Tolerated treatment well. Still most challenged by ER and reaching behind his back. Patient exhibited good technique with therapeutic exercises and would benefit from continued PT      Plan: Continue per plan of care.      Precautions: standard    Manuals           GHJ mobs  Post glide GrIII 3x30 QD Post GrIII 3x30 QD          ACJ mobs  PA glide 3x30 GrIII PA glide 3x30  GrIII          SCJ mobs  GrIII inf glide 3x30 QD GrIII inf glide 3x30                                                 Assessment  QD 12' QD 5'          Neuro Re-Ed                                                                                                      Ther Ex           Supine shld flexion AAROM  HEP 3x10 3x10          IR stretch with strap  10x HEP 25x          ER stretch on wall  10x HEP           UBE   5'           PROM   15'                                    Education 10' 15' HEP 5'          Ther Activity                                       Gait Training                                       Modalities

## 2024-03-12 ENCOUNTER — OFFICE VISIT (OUTPATIENT)
Dept: PHYSICAL THERAPY | Facility: REHABILITATION | Age: 64
End: 2024-03-12
Payer: COMMERCIAL

## 2024-03-12 DIAGNOSIS — M75.01 ADHESIVE CAPSULITIS OF RIGHT SHOULDER: Primary | ICD-10-CM

## 2024-03-12 PROCEDURE — 97140 MANUAL THERAPY 1/> REGIONS: CPT | Performed by: PHYSICAL THERAPIST

## 2024-03-12 PROCEDURE — 97110 THERAPEUTIC EXERCISES: CPT | Performed by: PHYSICAL THERAPIST

## 2024-03-12 NOTE — PROGRESS NOTES
Daily Note     Today's date: 3/12/2024  Patient name: Jered Caraballo  : 1960  MRN: [id number]  Referring provider: Naveed Rao MD  Dx:   Encounter Diagnosis     ICD-10-CM    1. Adhesive capsulitis of right shoulder  M75.01           Start Time: 0900  Stop Time: 0940  Total time in clinic (min): 40 minutes    Subjective: Reports he has noticed improved motion with his home exercises.       Objective: See treatment diary below    Flexion: 141 deg  Abduction: 92 deg  ER: 10 deg  IR: 34 deg    Assessment: Tolerated treatment well. Improved passive flexion, abduction and IR noted following PROM and mobilizations. Patient exhibited good technique with therapeutic exercises and would benefit from continued PT      Plan: Continue per plan of care.      Precautions: standard    Manuals 1/4 2/12 2/26 3/12         GHJ mobs  Post glide GrIII 3x30 QD Post GrIII 3x30 QD Post glide GrIV 3x30    Inf glide 3x30 GrIV+         ACJ mobs  PA glide 3x30 GrIII PA glide 3x30  GrIII PA/AP 3x30 GrIV         SCJ mobs  GrIII inf glide 3x30 QD GrIII inf glide 3x30 GrIV inf glide 3x30                                                Assessment  QD 12' QD 5' QD          Neuro Re-Ed 1/4 2/12 2/26 3/12                                                                                                    Ther Ex 1/4 2/12 2/26 3/12         Supine shld flexion AAROM  HEP 3x10 3x10          IR stretch with strap  10x HEP 25x          ER stretch on wall  10x HEP           UBE   5'           PROM   15' QD                                   Education 10' 15' HEP 5' 7'         Ther Activity                                       Gait Training                                       Modalities

## 2024-03-26 ENCOUNTER — OFFICE VISIT (OUTPATIENT)
Dept: PHYSICAL THERAPY | Facility: REHABILITATION | Age: 64
End: 2024-03-26
Payer: COMMERCIAL

## 2024-03-26 DIAGNOSIS — M75.01 ADHESIVE CAPSULITIS OF RIGHT SHOULDER: Primary | ICD-10-CM

## 2024-03-26 PROCEDURE — 97140 MANUAL THERAPY 1/> REGIONS: CPT | Performed by: PHYSICAL THERAPIST

## 2024-03-26 PROCEDURE — 97110 THERAPEUTIC EXERCISES: CPT | Performed by: PHYSICAL THERAPIST

## 2024-03-29 DIAGNOSIS — M79.601 PAIN OF RIGHT UPPER EXTREMITY: ICD-10-CM

## 2024-03-29 RX ORDER — MELOXICAM 15 MG/1
15 TABLET ORAL DAILY
Qty: 30 TABLET | Refills: 2 | Status: SHIPPED | OUTPATIENT
Start: 2024-03-29

## 2024-04-09 ENCOUNTER — OFFICE VISIT (OUTPATIENT)
Dept: PHYSICAL THERAPY | Facility: REHABILITATION | Age: 64
End: 2024-04-09
Payer: COMMERCIAL

## 2024-04-09 DIAGNOSIS — M75.01 ADHESIVE CAPSULITIS OF RIGHT SHOULDER: Primary | ICD-10-CM

## 2024-04-09 PROCEDURE — 97110 THERAPEUTIC EXERCISES: CPT | Performed by: PHYSICAL THERAPIST

## 2024-04-09 NOTE — PROGRESS NOTES
Daily Note     Today's date: 2024  Patient name: Jered Caraballo  : 1960  MRN: [id number]  Referring provider: Naveed Rao MD  Dx:   Encounter Diagnosis     ICD-10-CM    1. Adhesive capsulitis of right shoulder  M75.01             Start Time: 0815  Stop Time: 0845  Total time in clinic (min): 30 minutes    Subjective: Reports he has been keeping up with his exercises. Reports steady, but slow progress.       Objective: See treatment diary below    1:1 with Migel Sawyer -844    Flexion: 141 deg  Abduction: 92 deg  ER: 10 deg  IR: 34 deg    Assessment: Tolerated treatment well. Responded well to IR MWM with strap to improve active internal rotation. Patient exhibited good technique with therapeutic exercises and would benefit from continued PT      Plan: Continue per plan of care.      Precautions: standard    Manuals 1/4 2/12 2/26 3/12 3/26 4/9       GHJ mobs  Post glide GrIII 3x30 QD Post GrIII 3x30 QD Post glide GrIV 3x30    Inf glide 3x30 GrIV+ Post glide GrIV 3x30    Inf glide 3x30 GrIV+ Inf/psot glides 3x30 GrIV+    Quadrant 2x30 GrIV       ACJ mobs  PA glide 3x30 GrIII PA glide 3x30  GrIII PA/AP 3x30 GrIV PA/AP  3x30 GrIV        SCJ mobs  GrIII inf glide 3x30 QD GrIII inf glide 3x30 GrIV inf glide 3x30         IR MWM     3x8 2x10                                 Assessment  QD 12' QD 5' QD  QD QD       Neuro Re-Ed 1/4 2/12 2/26 3/12 3/26 4/9                                                                                                  Ther Ex 1/4 2/12 2/26 3/12 3/26 4/9       Supine shld flexion AAROM  HEP 3x10 3x10   3x15       IR stretch with strap  10x HEP 25x  10x  2x10       ER stretch on wall  10x HEP           UBE   5'           PROM   15' QD QD QD       Wall slide     2x10 2x10                    Education 10' 15' HEP 5' 7'         Ther Activity                                       Gait Training                                       Modalities

## 2024-04-23 ENCOUNTER — OFFICE VISIT (OUTPATIENT)
Dept: PHYSICAL THERAPY | Facility: REHABILITATION | Age: 64
End: 2024-04-23
Payer: COMMERCIAL

## 2024-04-23 DIAGNOSIS — M75.01 ADHESIVE CAPSULITIS OF RIGHT SHOULDER: Primary | ICD-10-CM

## 2024-04-23 PROCEDURE — 97140 MANUAL THERAPY 1/> REGIONS: CPT | Performed by: PHYSICAL THERAPIST

## 2024-04-23 PROCEDURE — 97110 THERAPEUTIC EXERCISES: CPT | Performed by: PHYSICAL THERAPIST

## 2024-04-23 NOTE — PROGRESS NOTES
"Daily Note     Today's date: 2024  Patient name: Jered Caraballo  : 1960  MRN: [id number]  Referring provider: Naveed Rao MD  Dx:   Encounter Diagnosis     ICD-10-CM    1. Adhesive capsulitis of right shoulder  M75.01             Start Time: 0815  Stop Time: 0855  Total time in clinic (min): 40 minutes    Subjective: Reports he feels reaching behind his back has been better.     Objective: See treatment diary below    Flexion: 141 deg  Abduction: 92 deg  ER: 10 deg  IR: 34 deg    Assessment: Tolerated treatment well. Patient exhibited good technique with therapeutic exercises and would benefit from continued PT      Plan: Continue per plan of care.      Precautions: standard    Manuals 4/23 2/12 2/26 3/12 3/26 4/9   GHJ mobs Inf/post glides 3x30 GrIV    Quadrant 3x30 GrIV Post glide GrIII 3x30 QD Post GrIII 3x30 QD Post glide GrIV 3x30    Inf glide 3x30 GrIV+ Post glide GrIV 3x30    Inf glide 3x30 GrIV+ Inf/psot glides 3x30 GrIV+    Quadrant 2x30 GrIV   ACJ mobs  PA glide 3x30 GrIII PA glide 3x30  GrIII PA/AP 3x30 GrIV PA/AP  3x30 GrIV    SCJ mobs  GrIII inf glide 3x30 QD GrIII inf glide 3x30 GrIV inf glide 3x30     IR MWM 3x8    3x8 2x10                     Assessment  QD 12' QD 5' QD  QD QD   Neuro Re-Ed 4/23 2/12 2/26 3/12 3/26 4/9                                                                  Ther Ex 4/23 2/12 2/26 3/12 3/26 4   Supine shld flexion AAROM 3x15 HEP 3x10 3x10   3x15   IR stretch with strap 15x5\" 10x HEP 25x  10x  2x10   ER stretch on wall  10x HEP       UBE   5'       PROM QD  15' QD QD QD   Wall slide 2x10    2x10 2x10            Education  15' HEP 5' 7'     Ther Activity                           Gait Training                           Modalities                                  "

## 2024-04-29 ENCOUNTER — OFFICE VISIT (OUTPATIENT)
Dept: OBGYN CLINIC | Facility: OTHER | Age: 64
End: 2024-04-29
Payer: COMMERCIAL

## 2024-04-29 VITALS
HEART RATE: 66 BPM | HEIGHT: 72 IN | BODY MASS INDEX: 30.75 KG/M2 | DIASTOLIC BLOOD PRESSURE: 75 MMHG | SYSTOLIC BLOOD PRESSURE: 131 MMHG | WEIGHT: 227 LBS

## 2024-04-29 DIAGNOSIS — M75.01 ADHESIVE CAPSULITIS OF RIGHT SHOULDER: Primary | ICD-10-CM

## 2024-04-29 PROCEDURE — 99213 OFFICE O/P EST LOW 20 MIN: CPT | Performed by: ORTHOPAEDIC SURGERY

## 2024-04-29 NOTE — PROGRESS NOTES
Assessment  Diagnoses and all orders for this visit:    Adhesive capsulitis of right shoulder    Discussion and Plan:    Patient has made progress with ROM as compared to the last visit.  Patient may wean from formal PT to HEP  Recommend continuing home stretching program to regain full ROM.  Patient was encouraged to reach out to his PCP because adhesive capsulitis is typically seen in diabetic males and he has not had a recent hemoglobin A1C.  We will also send today's note as well as a staff message.   Return if symptoms fail to improve    Subjective:   Patient ID: Jered Caraballo is a 63 y.o. male      HPI  Patient presents today for follow up of right shoulder adhesive capsulitis. Patient was provided with a CS injection and referral to PT at the last visit. Patient has been attending PT as instructed and reports he has seen gains in range of motion.       The following portions of the patient's history were reviewed and updated as appropriate: allergies, current medications, past family history, past medical history, past social history, past surgical history and problem list.      Objective:  /75 (BP Location: Right arm, Patient Position: Sitting, Cuff Size: Large)   Pulse 66   Ht 6' (1.829 m)   Wt 103 kg (227 lb)   BMI 30.79 kg/m²       Right Shoulder Exam     Tenderness   The patient is experiencing no tenderness.    Range of Motion   Passive abduction:  90   External rotation:  20   Forward flexion:  160   Internal rotation 0 degrees:  Sacrum     Other   Erythema: absent  Sensation: normal  Pulse: present            Physical Exam  Vitals reviewed.   Constitutional:       Appearance: He is well-developed.   HENT:      Head: Normocephalic.   Eyes:      Pupils: Pupils are equal, round, and reactive to light.   Pulmonary:      Effort: Pulmonary effort is normal.   Abdominal:      General: Abdomen is flat. There is no distension.   Skin:     General: Skin is warm and dry.           I have personally  reviewed pertinent films in PACS and my interpretation is as follows.    No new imaging    Scribe Attestation      I,:  Kelin Donaldson am acting as a scribe while in the presence of the attending physician.:       I,:  Zen Acevedo MD personally performed the services described in this documentation    as scribed in my presence.:

## 2024-05-07 ENCOUNTER — EVALUATION (OUTPATIENT)
Dept: PHYSICAL THERAPY | Facility: REHABILITATION | Age: 64
End: 2024-05-07
Payer: COMMERCIAL

## 2024-05-07 DIAGNOSIS — M75.01 ADHESIVE CAPSULITIS OF RIGHT SHOULDER: Primary | ICD-10-CM

## 2024-05-07 PROCEDURE — 97140 MANUAL THERAPY 1/> REGIONS: CPT | Performed by: PHYSICAL THERAPIST

## 2024-05-07 PROCEDURE — 97110 THERAPEUTIC EXERCISES: CPT | Performed by: PHYSICAL THERAPIST

## 2024-05-07 NOTE — PROGRESS NOTES
"Daily Note     Today's date: 2024  Patient name: Jered Caraballo  : 1960  MRN: [id number]  Referring provider: Naveed Rao MD  Dx:   Encounter Diagnosis     ICD-10-CM    1. Adhesive capsulitis of right shoulder  M75.01             Start Time: 0815  Stop Time: 0840  Total time in clinic (min): 25 minutes    Subjective: Reports he feels he is progressing. Max pain level 0/10. Reports mostly tightness.    Objective: See treatment diary below    Flexion: 159 deg  Abduction: 156 deg  ER: 62 deg @ 90 of abduction  IR: 39 deg    Assessment: Tolerated treatment well. D/C at this time to HEP. Pt will follow up with office if needed.     Goals  ST-4 weeks  Patient will be independent with home exercise program.  MET  Patient will be able to manage symptoms independently. MET  Patient will decrease pain by 25-50% MET    LTG: by discharge  Patient will improve FOTO to goal MET  Patient will report minimal (1-2/10) pain with aggravating activities to display improvements in overall functional status MET    Plan: D/C.     Precautions: standard    Manuals 4/23 5/7 2/26 3/12 3/26 4/9   GHJ mobs Inf/post glides 3x30 GrIV    Quadrant 3x30 GrIV  Post GrIII 3x30 QD Post glide GrIV 3x30    Inf glide 3x30 GrIV+ Post glide GrIV 3x30    Inf glide 3x30 GrIV+ Inf/psot glides 3x30 GrIV+    Quadrant 2x30 GrIV   ACJ mobs   PA glide 3x30  GrIII PA/AP 3x30 GrIV PA/AP  3x30 GrIV    SCJ mobs   GrIII inf glide 3x30 GrIV inf glide 3x30     IR MWM 3x8 2x9   3x8 2x10                     Assessment  QD 15' QD 5' QD  QD QD   Neuro Re-Ed 4/23 5/7 2/26 3/12 3/26 4/9                                                                  Ther Ex 4/23 5/7 2/26 3/12 3/26 4/9   Supine shld flexion AAROM 3x15  3x10   3x15   IR stretch with strap 15x5\" 2x10 25x  10x  2x10   ER stretch on wall         UBE   5'       PROM QD QD 15' QD QD QD   Wall slide 2x10 2x10   2x10 2x10            Education  5' HEP 5' 7'     Ther Activity                         "   Gait Training                           Modalities

## (undated) RX ORDER — PREDNISOLONE ACETATE 10 MG/ML: 1 SUSPENSION/ DROPS OPHTHALMIC